# Patient Record
Sex: MALE
[De-identification: names, ages, dates, MRNs, and addresses within clinical notes are randomized per-mention and may not be internally consistent; named-entity substitution may affect disease eponyms.]

---

## 2018-09-25 ENCOUNTER — HOSPITAL ENCOUNTER (EMERGENCY)
Dept: HOSPITAL 42 - ED | Age: 23
Discharge: HOME | End: 2018-09-25
Payer: SELF-PAY

## 2018-09-25 VITALS — RESPIRATION RATE: 18 BRPM | OXYGEN SATURATION: 100 % | TEMPERATURE: 98.3 F

## 2018-09-25 VITALS — HEART RATE: 85 BPM | SYSTOLIC BLOOD PRESSURE: 131 MMHG | DIASTOLIC BLOOD PRESSURE: 80 MMHG

## 2018-09-25 VITALS — BODY MASS INDEX: 25.3 KG/M2

## 2018-09-25 DIAGNOSIS — K29.70: Primary | ICD-10-CM

## 2018-09-25 LAB
ALBUMIN SERPL-MCNC: 4.5 G/DL (ref 3–4.8)
ALBUMIN/GLOB SERPL: 1.3 {RATIO} (ref 1.1–1.8)
ALT SERPL-CCNC: 21 U/L (ref 7–56)
AST SERPL-CCNC: 21 U/L (ref 17–59)
BUN SERPL-MCNC: 16 MG/DL (ref 7–21)
CALCIUM SERPL-MCNC: 9.4 MG/DL (ref 8.4–10.5)
ERYTHROCYTE [DISTWIDTH] IN BLOOD BY AUTOMATED COUNT: 13.2 % (ref 11.5–14.5)
GFR NON-AFRICAN AMERICAN: > 60
HGB BLD-MCNC: 15.9 G/DL (ref 14–18)
MCH RBC QN AUTO: 30.2 PG (ref 25–35)
MCHC RBC AUTO-ENTMCNC: 34.3 G/DL (ref 31–37)
MCV RBC AUTO: 88 FL (ref 80–105)
PLATELET # BLD: 257 10^3/UL (ref 120–450)
PMV BLD AUTO: 10.4 FL (ref 7–11)
RBC # BLD AUTO: 5.26 10^6/UL (ref 3.5–6.1)
TROPONIN I SERPL-MCNC: < 0.01 NG/ML
WBC # BLD AUTO: 4.7 10^3/UL (ref 4.5–11)

## 2018-09-25 PROCEDURE — 84484 ASSAY OF TROPONIN QUANT: CPT

## 2018-09-25 PROCEDURE — 84100 ASSAY OF PHOSPHORUS: CPT

## 2018-09-25 PROCEDURE — 96374 THER/PROPH/DIAG INJ IV PUSH: CPT

## 2018-09-25 PROCEDURE — 93005 ELECTROCARDIOGRAM TRACING: CPT

## 2018-09-25 PROCEDURE — 80053 COMPREHEN METABOLIC PANEL: CPT

## 2018-09-25 PROCEDURE — 83735 ASSAY OF MAGNESIUM: CPT

## 2018-09-25 PROCEDURE — 99283 EMERGENCY DEPT VISIT LOW MDM: CPT

## 2018-09-25 PROCEDURE — 85027 COMPLETE CBC AUTOMATED: CPT

## 2018-09-25 NOTE — ED PDOC
Arrival/HPI





<Priya Prajapati - Last Filed: 09/26/18 05:16>





<John Paul Barrera - Last Filed: 09/26/18 22:01>





- General


Time Seen by Provider: 09/25/18 21:08





Past Medical History





- Psychiatric


Hx Substance Use: No





- Anesthesia


Hx Anesthesia: No





<Priya Prajapati - Last Filed: 09/26/18 05:16>





- Provider Review


Nursing Documentation Reviewed: Yes





<John Paul Barrera - Last Filed: 09/26/18 22:01>





Family/Social History


Family/Social History: No Known Family HX


Smoking Status: Never Smoked


Hx Alcohol Use: Yes


Frequency of alcohol use: Socially


Hx Substance Use: No





<Priya Prajapati - Last Filed: 09/26/18 05:16>





- Physician Review


Nursing Documentation Reviewed: Yes





<John Paul Barrera - Last Filed: 09/26/18 22:01>





Allergies/Home Meds





<Priya Prajapati - Last Filed: 09/26/18 05:16>





<John Paul Barrera - Last Filed: 09/26/18 22:01>


Allergies/Adverse Reactions: 


Allergies





No Known Allergies Allergy (Verified 09/25/18 21:08)


   











Review of Systems





- Physician Review


All systems were reviewed & negative as marked: Yes





- Review of Systems


Respiratory: absent: SOB, Cough, Wheezing


Cardiovascular: Chest Pain (which he states is a burning sensation that is mid-

sternal with out radiation.).  absent: Palpitations, Edema, Orthopnea, Syncope





<Priya Prajapati - Last Filed: 09/26/18 05:16>





Physical Exam


Temperature: Afebrile


Blood Pressure: Normal


Pulse: Regular


Respiratory Rate: Normal


Appearance: Positive for: Well-Appearing, Non-Toxic, Comfortable


Pain Distress: None


Mental Status: Positive for: Alert and Oriented X 3





- Systems Exam


Head: Present: Atraumatic, Normocephalic


Pupils: Present: PERRL


Extroacular Muscles: Present: EOMI


Mouth: Present: Moist Mucous Membranes


Respiratory/Chest: Present: Clear to Auscultation, Good Air Exchange.  No: 

Respiratory Distress, Accessory Muscle Use, Wheezes, Rales


Cardiovascular: Present: Regular Rate and Rhythm, Normal S1, S2.  No: Irregular 

Rhythm, Gallop


Abdomen: Present: Normal Bowel Sounds.  No: Tenderness, Distention, Peritoneal 

Signs, Rebound, Guarding


Lower Extremity: Present: Normal Inspection.  No: Edema, CALF TENDERNESS, NORMAL

PULSES


Neurological: Present: GCS=15, CN II-XII Intact, Speech Normal


Skin: Present: Warm, Dry, Normal Color.  No: Rashes, Diaphoretic


Psychiatric: Present: Alert, Oriented x 3, Normal Insight, Normal Concentration





<[employer] - Last Filed: 09/26/18 05:16>


Vital Signs











  Temp Pulse Resp BP Pulse Ox


 


 09/25/18 23:12   85  18  131/80  100


 


 09/25/18 21:07  98.3 F  95 H  18  139/80  100














- Lab Interpretations


Lab Results: 








                                 09/25/18 22:07 





                                 09/25/18 22:07 





                                   Lab Results





09/25/18 22:07: Sodium 140, Potassium 4.2, Chloride 103, Carbon Dioxide 28, 

Anion Gap 14, BUN 16, Creatinine 0.9, Est GFR (African Amer) > 60, Est GFR (Non-

Af Amer) > 60, Random Glucose 100, Calcium 9.4, Phosphorus 3.3, Magnesium 1.9, 

Total Bilirubin 0.9, AST 21, ALT 21, Alkaline Phosphatase 74, Troponin I < 0.01,

Total Protein 8.0, Albumin 4.5, Globulin 3.5, Albumin/Globulin Ratio 1.3


09/25/18 22:07: WBC 4.7, RBC 5.26, Hgb 15.9, Hct 46.3, MCV 88.0, MCH 30.2, MCHC 

34.3, RDW 13.2, Plt Count 257, MPV 10.4











- Medication Orders


Current Medication Orders: 











Discontinued Medications





Pantoprazole Sodium (Protonix Inj)  40 mg IVP STAT STA


   Stop: 09/25/18 21:31


   Last Admin: 09/25/18 21:46  Dose: 40 mg





IVP Administration


 Document     09/25/18 21:46  JORGE L  (Rec: 09/25/18 21:56  [id number])


     Charges for Administration


      # of IVP Administrations                   1














Disposition/Present on Arrival





- Present on Arrival


Any Indicators Present on Arrival: No


History of DVT/PE: No


History of Uncontrolled Diabetes: No


Urinary Catheter: No


History of Decub. Ulcer: No


History Surgical Site Infection Following: None





- Disposition


Have Diagnosis and Disposition been Completed?: Yes


Disposition Time: 23:30





<Priya Prajapati - Last Filed: 09/26/18 05:16>





<John Paul Barrera - Last Filed: 09/26/18 22:01>





- Disposition


Diagnosis: 


 Gastritis





Disposition: HOME/ ROUTINE


Condition: GOOD


Discharge Instructions (ExitCare):  Gastritis


Additional Instructions: 


follow up with pmd


Prescriptions: 


Pantoprazole Sodium [Protonix] 40 mg PO DAILY #14 ect


Forms:  [employer] (English), WORK NOTE

## 2018-09-26 NOTE — CARD
--------------- APPROVED REPORT --------------





Date of service: 09/25/2018



EKG Measurement

Heart [id number]

MD [id number]

[id number]

[id number]

[id number]



<Conclusion>

Sinus tachycardia

PRWP, V 1 - 3, possible due to lead placement. Suggest repeat.

ST elevations V 1 - 3

Suggest clinical correlation.



I spoke to Dr. Miguel.